# Patient Record
Sex: FEMALE | Race: BLACK OR AFRICAN AMERICAN | Employment: FULL TIME | URBAN - METROPOLITAN AREA
[De-identification: names, ages, dates, MRNs, and addresses within clinical notes are randomized per-mention and may not be internally consistent; named-entity substitution may affect disease eponyms.]

---

## 2017-08-28 ENCOUNTER — APPOINTMENT (OUTPATIENT)
Dept: GENERAL RADIOLOGY | Age: 52
End: 2017-08-28
Attending: EMERGENCY MEDICINE
Payer: MEDICARE

## 2017-08-28 ENCOUNTER — HOSPITAL ENCOUNTER (EMERGENCY)
Age: 52
Discharge: HOME | End: 2017-08-28
Attending: EMERGENCY MEDICINE
Payer: MEDICARE

## 2017-08-28 VITALS
TEMPERATURE: 98 F | HEIGHT: 59 IN | SYSTOLIC BLOOD PRESSURE: 119 MMHG | OXYGEN SATURATION: 100 % | RESPIRATION RATE: 16 BRPM | WEIGHT: 182 LBS | DIASTOLIC BLOOD PRESSURE: 83 MMHG | BODY MASS INDEX: 36.69 KG/M2 | HEART RATE: 75 BPM

## 2017-08-28 DIAGNOSIS — S93.601A FOOT SPRAIN, RIGHT, INITIAL ENCOUNTER: Primary | ICD-10-CM

## 2017-08-28 PROCEDURE — 73610 X-RAY EXAM OF ANKLE: CPT

## 2017-08-28 PROCEDURE — 73630 X-RAY EXAM OF FOOT: CPT

## 2017-08-28 PROCEDURE — 99283 EMERGENCY DEPT VISIT LOW MDM: CPT | Performed by: PHYSICIAN ASSISTANT

## 2017-08-28 NOTE — ED TRIAGE NOTES
Patient advises that she stepped into a hole in yard 3 days ago and complaining of right foot and ankle pain. Patient advises her family doctor wanted her to come here for Xray. Patient denies any further complaint.

## 2017-08-28 NOTE — ED PROVIDER NOTES
Patient is a 46 y.o. female presenting with foot injury. The history is provided by the patient. Foot Injury    This is a new problem. Episode onset: 3 days ago  The problem occurs constantly. The problem has not changed since onset. The pain is present in the right foot. The quality of the pain is described as aching. The pain is at a severity of 8/10. The pain is mild. Associated symptoms include stiffness. Pertinent negatives include no tingling. The symptoms are aggravated by activity and palpation. She has tried cold and rest for the symptoms. The treatment provided mild relief. There has been a history of trauma. Past Medical History:   Diagnosis Date    Anemia     Arthritis     Heart murmur     MVP (mitral valve prolapse)     Psychiatric disorder     depression       Past Surgical History:   Procedure Laterality Date    HX HYSTERECTOMY      HX OVARIAN CYST REMOVAL  1980's    HX TUBAL LIGATION  12/1984         Family History:   Problem Relation Age of Onset    Heart Disease Mother     Stroke Mother     Cancer Mother     No Known Problems Father        Social History     Social History    Marital status:      Spouse name: N/A    Number of children: N/A    Years of education: N/A     Occupational History    Not on file. Social History Main Topics    Smoking status: Former Smoker     Packs/day: 1.00     Years: 30.00     Quit date: 7/17/2008    Smokeless tobacco: Never Used    Alcohol use No    Drug use: No    Sexual activity: Not on file     Other Topics Concern    Not on file     Social History Narrative         ALLERGIES: Review of patient's allergies indicates no known allergies. Review of Systems   Musculoskeletal: Positive for stiffness. Neurological: Negative for tingling. All other systems reviewed and are negative.       Vitals:    08/28/17 1701   BP: 119/83   Pulse: 75   Resp: 16   Temp: 98 °F (36.7 °C)   SpO2: 100%   Weight: 82.6 kg (182 lb)   Height: 4' 11\" (1.499 m)            Physical Exam   Constitutional: She is oriented to person, place, and time. She appears well-developed and well-nourished. No distress. HENT:   Head: Normocephalic and atraumatic. Eyes: Conjunctivae and EOM are normal. Pupils are equal, round, and reactive to light. Neck: Normal range of motion. Neck supple. Cardiovascular: Normal rate and regular rhythm. Pulmonary/Chest: Effort normal and breath sounds normal. No respiratory distress. She has no wheezes. Abdominal: Soft. Bowel sounds are normal.   Musculoskeletal: She exhibits tenderness. She exhibits no edema or deformity. Left foot w/o deformity, dorsal pain to palpation, no ankle or knee pain    Neurological: She is alert and oriented to person, place, and time. Skin: Skin is warm and dry. Psychiatric: She has a normal mood and affect. Nursing note and vitals reviewed.        MDM  Number of Diagnoses or Management Options  Diagnosis management comments: Rt foot and ankle x rays negative wrap placed for foot sprain  Tylenol or motrin for pain        Amount and/or Complexity of Data Reviewed  Tests in the radiology section of CPT®: ordered and reviewed  Review and summarize past medical records: yes    Risk of Complications, Morbidity, and/or Mortality  Presenting problems: low  Diagnostic procedures: low  Management options: low    Patient Progress  Patient progress: improved    ED Course       Procedures

## 2017-08-28 NOTE — DISCHARGE INSTRUCTIONS
Foot Sprain: Care Instructions  Your Care Instructions    A foot sprain occurs when you stretch or tear the ligaments around your foot. Ligaments are the tough tissues that connect one bone to another. A sprain can happen when you run, fall, or hit your toe against something. Sprains often happen when you jump or change direction quickly. This may occur when you play basketball, soccer, or other sports. Most foot sprains will get better with treatment at home. Follow-up care is a key part of your treatment and safety. Be sure to make and go to all appointments, and call your doctor if you are having problems. It's also a good idea to know your test results and keep a list of the medicines you take. How can you care for yourself at home? · Walk or put weight on your sprained foot as long as it does not hurt. · If your doctor gave you a splint or immobilizer, wear it as directed. If you were given crutches, use them as directed. · For the first 2 days after your injury, avoid hot showers, hot tubs, or hot packs. They may increase swelling. · Put ice or a cold pack on your foot for 10 to 20 minutes at a time to stop swelling. Try this every 1 to 2 hours for 3 days (when you are awake) or until the swelling goes down. Put a thin cloth between the ice pack and your skin. Keep your splint dry. · After 2 or 3 days, if your swelling is gone, put a heating pad (set on low) or a warm cloth on your foot. Some doctors suggest that you go back and forth between hot and cold treatments. · Prop up your foot on a pillow when you ice it or anytime you sit or lie down. Try to keep it above the level of your heart. This will help reduce swelling. · Take pain medicines exactly as directed. ¨ If the doctor gave you a prescription medicine for pain, take it as prescribed. ¨ If you are not taking a prescription pain medicine, ask your doctor if you can take an over-the-counter medicine.   · Do any exercises that your doctor or physical therapist suggests. · Return to your usual exercise gradually as you feel better. When should you call for help? Call your doctor now or seek immediate medical care if:  · You have increased or severe pain. · Your toes are cool or pale or change color. · Your wrap or splint feels too tight. · You have signs of a blood clot, such as:  ¨ Pain in your calf, back of the knee, thigh, or groin. ¨ Redness and swelling in your leg or groin. · You have tingling, weakness, or numbness in your leg or foot. Watch closely for changes in your health, and be sure to contact your doctor if:  · You cannot put any weight on your foot. · You get a fever. · You do not get better as expected. Where can you learn more? Go to http://farshad-dayana.info/. Enter K719 in the search box to learn more about \"Foot Sprain: Care Instructions. \"  Current as of: March 21, 2017  Content Version: 11.3  © 9422-2492 [employer]. Care instructions adapted under license by [employer] (which disclaims liability or warranty for this information). If you have questions about a medical condition or this instruction, always ask your healthcare professional. [address] any warranty or liability for your use of this information.

## 2018-01-29 ENCOUNTER — HOSPITAL ENCOUNTER (EMERGENCY)
Age: 53
Discharge: HOME | End: 2018-01-29
Attending: EMERGENCY MEDICINE
Payer: MEDICARE

## 2018-01-29 VITALS
OXYGEN SATURATION: 98 % | WEIGHT: 180 LBS | HEART RATE: 71 BPM | HEIGHT: 59 IN | DIASTOLIC BLOOD PRESSURE: 84 MMHG | BODY MASS INDEX: 36.29 KG/M2 | TEMPERATURE: 97.8 F | SYSTOLIC BLOOD PRESSURE: 122 MMHG | RESPIRATION RATE: 16 BRPM

## 2018-01-29 DIAGNOSIS — T78.40XA ALLERGIC REACTION, INITIAL ENCOUNTER: Primary | ICD-10-CM

## 2018-01-29 LAB
ALBUMIN SERPL-MCNC: 3.4 G/DL (ref 3.5–5)
ALBUMIN/GLOB SERPL: 1.2 {RATIO} (ref 1.2–3.5)
ALP SERPL-CCNC: 115 U/L (ref 50–136)
ALT SERPL-CCNC: 25 U/L (ref 12–65)
ANION GAP SERPL CALC-SCNC: 12 MMOL/L (ref 7–16)
AST SERPL-CCNC: 21 U/L (ref 15–37)
BASOPHILS # BLD: 0 K/UL (ref 0–0.2)
BASOPHILS NFR BLD: 0 % (ref 0–2)
BILIRUB SERPL-MCNC: 0.2 MG/DL (ref 0.2–1.1)
BUN SERPL-MCNC: 7 MG/DL (ref 6–23)
CALCIUM SERPL-MCNC: 8.9 MG/DL (ref 8.3–10.4)
CHLORIDE SERPL-SCNC: 112 MMOL/L (ref 98–107)
CO2 SERPL-SCNC: 23 MMOL/L (ref 21–32)
CREAT SERPL-MCNC: 0.74 MG/DL (ref 0.6–1)
DIFFERENTIAL METHOD BLD: ABNORMAL
EOSINOPHIL # BLD: 0.2 K/UL (ref 0–0.8)
EOSINOPHIL NFR BLD: 4 % (ref 0.5–7.8)
ERYTHROCYTE [DISTWIDTH] IN BLOOD BY AUTOMATED COUNT: 15 % (ref 11.9–14.6)
GLOBULIN SER CALC-MCNC: 2.9 G/DL (ref 2.3–3.5)
GLUCOSE SERPL-MCNC: 104 MG/DL (ref 65–100)
HCT VFR BLD AUTO: 35.6 % (ref 35.8–46.3)
HGB BLD-MCNC: 11.5 G/DL (ref 11.7–15.4)
IMM GRANULOCYTES # BLD: 0 K/UL (ref 0–0.5)
IMM GRANULOCYTES NFR BLD AUTO: 0 % (ref 0–5)
LYMPHOCYTES # BLD: 2.7 K/UL (ref 0.5–4.6)
LYMPHOCYTES NFR BLD: 52 % (ref 13–44)
MCH RBC QN AUTO: 26.9 PG (ref 26.1–32.9)
MCHC RBC AUTO-ENTMCNC: 32.3 G/DL (ref 31.4–35)
MCV RBC AUTO: 83.2 FL (ref 79.6–97.8)
MONOCYTES # BLD: 0.5 K/UL (ref 0.1–1.3)
MONOCYTES NFR BLD: 9 % (ref 4–12)
NEUTS SEG # BLD: 1.8 K/UL (ref 1.7–8.2)
NEUTS SEG NFR BLD: 35 % (ref 43–78)
PLATELET # BLD AUTO: 279 K/UL (ref 150–450)
PMV BLD AUTO: 10.5 FL (ref 10.8–14.1)
POTASSIUM SERPL-SCNC: 3.6 MMOL/L (ref 3.5–5.1)
PROT SERPL-MCNC: 6.3 G/DL (ref 6.3–8.2)
RBC # BLD AUTO: 4.28 M/UL (ref 4.05–5.25)
SODIUM SERPL-SCNC: 147 MMOL/L (ref 136–145)
WBC # BLD AUTO: 5.2 K/UL (ref 4.3–11.1)

## 2018-01-29 PROCEDURE — 81003 URINALYSIS AUTO W/O SCOPE: CPT | Performed by: EMERGENCY MEDICINE

## 2018-01-29 PROCEDURE — 80053 COMPREHEN METABOLIC PANEL: CPT | Performed by: EMERGENCY MEDICINE

## 2018-01-29 PROCEDURE — 99284 EMERGENCY DEPT VISIT MOD MDM: CPT | Performed by: EMERGENCY MEDICINE

## 2018-01-29 PROCEDURE — 74011250637 HC RX REV CODE- 250/637: Performed by: EMERGENCY MEDICINE

## 2018-01-29 PROCEDURE — 74011636637 HC RX REV CODE- 636/637: Performed by: EMERGENCY MEDICINE

## 2018-01-29 PROCEDURE — 85025 COMPLETE CBC W/AUTO DIFF WBC: CPT | Performed by: EMERGENCY MEDICINE

## 2018-01-29 RX ORDER — PREDNISONE 20 MG/1
60 TABLET ORAL
Status: COMPLETED | OUTPATIENT
Start: 2018-01-29 | End: 2018-01-29

## 2018-01-29 RX ORDER — DIPHENHYDRAMINE HCL 25 MG
25 CAPSULE ORAL
Status: COMPLETED | OUTPATIENT
Start: 2018-01-29 | End: 2018-01-29

## 2018-01-29 RX ORDER — PREDNISONE 20 MG/1
40 TABLET ORAL DAILY
Qty: 6 TAB | Refills: 0 | Status: SHIPPED | OUTPATIENT
Start: 2018-01-29 | End: 2018-02-01

## 2018-01-29 RX ORDER — SODIUM CHLORIDE 0.9 % (FLUSH) 0.9 %
5-10 SYRINGE (ML) INJECTION EVERY 8 HOURS
Status: DISCONTINUED | OUTPATIENT
Start: 2018-01-29 | End: 2018-01-29 | Stop reason: HOSPADM

## 2018-01-29 RX ORDER — SODIUM CHLORIDE 0.9 % (FLUSH) 0.9 %
5-10 SYRINGE (ML) INJECTION AS NEEDED
Status: DISCONTINUED | OUTPATIENT
Start: 2018-01-29 | End: 2018-01-29 | Stop reason: HOSPADM

## 2018-01-29 RX ADMIN — PREDNISONE 60 MG: 20 TABLET ORAL at 01:58

## 2018-01-29 RX ADMIN — DIPHENHYDRAMINE HYDROCHLORIDE 25 MG: 25 CAPSULE ORAL at 01:58

## 2018-01-29 NOTE — ED TRIAGE NOTES
Pt stated that reaction started after dying her hair \"two days ago\". Pt has took benadryl 30 minutes ago, but cannot recall amount. Denies trouble breathing swallowing, and seeing. Pt reports localized swelling around base of neck and sides of head.

## 2018-01-29 NOTE — ED NOTES
I have reviewed discharge instructions with the patient. The patient verbalized understanding. Patient left ED via Discharge Method: ambulatory to Home with ( family, self). Opportunity for questions and clarification provided. Patient given 1 scripts. To continue your aftercare when you leave the hospital, you may receive an automated call from our care team to check in on how you are doing. This is a free service and part of our promise to provide the best care and service to meet your aftercare needs.  If you have questions, or wish to unsubscribe from this service please call [phone]. Thank you for Choosing our [facility] Emergency Department.

## 2018-01-29 NOTE — LETTER
[facility] EMERGENCY DEPT 
[address] [place] [id number] 
[phone] Work/School Note Date: 1/29/2018 To Whom It May concern: 
 
Serge Schuler was seen and treated today in the emergency room by the following provider(s): 
Attending Provider: Aaron Porras MD.   
 
Serge Number {Return to school/sport/work:1/30/2018 Sincerely, Meron Chacon RN

## 2018-01-29 NOTE — DISCHARGE INSTRUCTIONS

## 2018-01-29 NOTE — ED NOTES
Pt presents to the ED for a possible allergic reaction after dyeing her hair yesterday.   C/o her scalp swelling

## 2018-01-30 NOTE — ED PROVIDER NOTES
Patient is a 46 y.o. female presenting with allergic reaction. The history is provided by the patient. Allergic Reaction    This is a new problem. The problem has not changed since onset. Ingested substance: hair dye, topcally. Ingestion amount is known. She has not vomited. There were no pill fragments found in her mouth. There were no witnesses present for the ingestion. Pertinent negatives include no unusual behavior, no nausea, no vomiting and no shortness of breath. Other available medicines included diphenhydramine. Past medical history comments: graying. Past Medical History:   Diagnosis Date    Anemia     Heart murmur     MVP (mitral valve prolapse)     Psychiatric disorder     depression       Past Surgical History:   Procedure Laterality Date    HX HYSTERECTOMY      HX OVARIAN CYST REMOVAL  1980's    HX TUBAL LIGATION  12/1984         Family History:   Problem Relation Age of Onset    Heart Disease Mother     Stroke Mother     Cancer Mother     No Known Problems Father        Social History     Social History    Marital status:      Spouse name: N/A    Number of children: N/A    Years of education: N/A     Occupational History    Not on file. Social History Main Topics    Smoking status: Former Smoker     Packs/day: 1.00     Years: 30.00     Quit date: 7/17/2008    Smokeless tobacco: Never Used    Alcohol use No    Drug use: No    Sexual activity: Not on file     Other Topics Concern    Not on file     Social History Narrative         ALLERGIES: Review of patient's allergies indicates no known allergies. Review of Systems   Constitutional: Negative for chills and fever. HENT: Negative for rhinorrhea, sore throat, trouble swallowing and voice change. Eyes: Negative for discharge and redness. Respiratory: Negative for cough, shortness of breath, wheezing and stridor. Cardiovascular: Negative for chest pain.    Gastrointestinal: Negative for abdominal pain, nausea and vomiting. Musculoskeletal: Negative for arthralgias and back pain. Skin: Positive for rash. Neurological: Negative for dizziness and headaches. All other systems reviewed and are negative. Vitals:    01/29/18 0036 01/29/18 0209   BP: 127/87 122/84   Pulse: 69 71   Resp:  16   Temp: 97.8 °F (36.6 °C)    SpO2: 98% 98%   Weight: 81.6 kg (180 lb)    Height: 4' 11\" (1.499 m)             Physical Exam   Constitutional: She is oriented to person, place, and time. She appears well-developed and well-nourished. No distress. HENT:   Head: Normocephalic and atraumatic. Eyes: Conjunctivae and EOM are normal. Right eye exhibits no discharge. Left eye exhibits no discharge. Neck: Normal range of motion. Neck supple. Pulmonary/Chest: Effort normal. No respiratory distress. Musculoskeletal: Normal range of motion. Neurological: She is alert and oriented to person, place, and time. She has normal strength. She exhibits normal muscle tone. cni 2-12 grossly  Nl gait,  Nl speech     Skin: Skin is warm and dry. Rash noted. She is not diaphoretic. Eczematous dry thickening about neck and scalp   Psychiatric: She has a normal mood and affect. Her behavior is normal.   Nursing note and vitals reviewed. MDM  Number of Diagnoses or Management Options  Allergic reaction, initial encounter:   Diagnosis management comments: Medical decision making note:  Minor allergic reaction,  eteroids  This concludes the \"medical decision making note\" part of this emergency department visit note.       ED Course       Procedures

## 2019-03-22 ENCOUNTER — HOSPITAL ENCOUNTER (OUTPATIENT)
Dept: GENERAL RADIOLOGY | Age: 54
Discharge: HOME | End: 2019-03-22
Payer: MEDICARE

## 2019-03-22 DIAGNOSIS — M25.561 ACUTE PAIN OF RIGHT KNEE: ICD-10-CM

## 2019-03-22 PROCEDURE — 73560 X-RAY EXAM OF KNEE 1 OR 2: CPT

## 2019-04-26 ENCOUNTER — HOSPITAL ENCOUNTER (OUTPATIENT)
Dept: LAB | Age: 54
Discharge: HOME | End: 2019-04-26

## 2019-04-26 PROCEDURE — 88305 TISSUE EXAM BY PATHOLOGIST: CPT

## 2019-04-26 PROCEDURE — 88312 SPECIAL STAINS GROUP 1: CPT

## 2019-05-15 ENCOUNTER — HOSPITAL ENCOUNTER (OUTPATIENT)
Dept: MRI IMAGING | Age: 54
Discharge: HOME | End: 2019-05-15
Attending: NURSE PRACTITIONER
Payer: MEDICARE

## 2019-05-15 DIAGNOSIS — M25.561 ACUTE PAIN OF RIGHT KNEE: ICD-10-CM

## 2019-05-15 PROCEDURE — 73721 MRI JNT OF LWR EXTRE W/O DYE: CPT

## 2019-07-19 ENCOUNTER — HOSPITAL ENCOUNTER (OUTPATIENT)
Dept: ULTRASOUND IMAGING | Age: 54
Discharge: HOME | End: 2019-07-19
Attending: NURSE PRACTITIONER
Payer: MEDICARE

## 2019-07-19 DIAGNOSIS — J39.2 THROAT MASS: ICD-10-CM

## 2019-07-19 PROCEDURE — 76536 US EXAM OF HEAD AND NECK: CPT

## 2019-08-01 ENCOUNTER — HOSPITAL ENCOUNTER (OUTPATIENT)
Dept: ULTRASOUND IMAGING | Age: 54
Discharge: HOME | End: 2019-08-01
Attending: NURSE PRACTITIONER

## 2020-03-01 ENCOUNTER — HOSPITAL ENCOUNTER (EMERGENCY)
Age: 55
Discharge: HOME | End: 2020-03-01
Attending: EMERGENCY MEDICINE
Payer: MEDICARE

## 2020-03-01 ENCOUNTER — APPOINTMENT (OUTPATIENT)
Dept: CT IMAGING | Age: 55
End: 2020-03-01
Attending: EMERGENCY MEDICINE
Payer: MEDICARE

## 2020-03-01 VITALS
HEIGHT: 59 IN | WEIGHT: 160 LBS | SYSTOLIC BLOOD PRESSURE: 143 MMHG | BODY MASS INDEX: 32.25 KG/M2 | RESPIRATION RATE: 16 BRPM | TEMPERATURE: 98 F | OXYGEN SATURATION: 96 % | DIASTOLIC BLOOD PRESSURE: 88 MMHG | HEART RATE: 74 BPM

## 2020-03-01 DIAGNOSIS — G44.229 CHRONIC TENSION-TYPE HEADACHE, NOT INTRACTABLE: Primary | ICD-10-CM

## 2020-03-01 PROCEDURE — 99283 EMERGENCY DEPT VISIT LOW MDM: CPT

## 2020-03-01 PROCEDURE — 70450 CT HEAD/BRAIN W/O DYE: CPT

## 2020-03-01 NOTE — DISCHARGE INSTRUCTIONS
Use Tylenol or ibuprofen as needed for pain. Follow-up with a primary care physician or return for any other acute concerns.
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
## 2020-03-01 NOTE — ED NOTES
I have reviewed discharge instructions with the patient. The patient verbalized understanding. Patient left ED via Discharge Method: ambulatory to Home with (self). Opportunity for questions and clarification provided. Patient given 0 scripts. To continue your aftercare when you leave the hospital, you may receive an automated call from our care team to check in on how you are doing. This is a free service and part of our promise to provide the best care and service to meet your aftercare needs.  If you have questions, or wish to unsubscribe from this service please call [phone]. Thank you for Choosing our [facility] Emergency Department.

## 2020-10-01 PROBLEM — R59.0 CERVICAL LYMPHADENOPATHY: Status: ACTIVE | Noted: 2020-10-01

## 2022-03-18 PROBLEM — R59.0 CERVICAL LYMPHADENOPATHY: Status: ACTIVE | Noted: 2020-10-01

## 2023-02-24 ENCOUNTER — HOSPITAL ENCOUNTER (EMERGENCY)
Age: 58
Discharge: HOME | End: 2023-02-24
Attending: EMERGENCY MEDICINE
Payer: MEDICARE

## 2023-02-24 VITALS
BODY MASS INDEX: 28.22 KG/M2 | HEART RATE: 78 BPM | TEMPERATURE: 97.8 F | RESPIRATION RATE: 16 BRPM | DIASTOLIC BLOOD PRESSURE: 82 MMHG | SYSTOLIC BLOOD PRESSURE: 146 MMHG | OXYGEN SATURATION: 100 % | HEIGHT: 59 IN | WEIGHT: 140 LBS

## 2023-02-24 DIAGNOSIS — M54.2 NECK PAIN: Primary | ICD-10-CM

## 2023-02-24 PROCEDURE — 96372 THER/PROPH/DIAG INJ SC/IM: CPT

## 2023-02-24 PROCEDURE — 99284 EMERGENCY DEPT VISIT MOD MDM: CPT

## 2023-02-24 PROCEDURE — 6360000002 HC RX W HCPCS: Performed by: STUDENT IN AN ORGANIZED HEALTH CARE EDUCATION/TRAINING PROGRAM

## 2023-02-24 RX ORDER — DEXAMETHASONE SODIUM PHOSPHATE 10 MG/ML
10 INJECTION, SOLUTION INTRAMUSCULAR; INTRAVENOUS ONCE
Status: COMPLETED | OUTPATIENT
Start: 2023-02-24 | End: 2023-02-24

## 2023-02-24 RX ORDER — MELOXICAM 7.5 MG/1
7.5 TABLET ORAL DAILY
Qty: 30 TABLET | Refills: 3 | Status: SHIPPED | OUTPATIENT
Start: 2023-02-24

## 2023-02-24 RX ORDER — KETOROLAC TROMETHAMINE 15 MG/ML
15 INJECTION, SOLUTION INTRAMUSCULAR; INTRAVENOUS ONCE
Status: COMPLETED | OUTPATIENT
Start: 2023-02-24 | End: 2023-02-24

## 2023-02-24 RX ORDER — TRAMADOL HYDROCHLORIDE 50 MG/1
50 TABLET ORAL EVERY 6 HOURS PRN
Qty: 12 TABLET | Refills: 0 | Status: SHIPPED | OUTPATIENT
Start: 2023-02-24 | End: 2023-02-27

## 2023-02-24 RX ADMIN — DEXAMETHASONE SODIUM PHOSPHATE 10 MG: 10 INJECTION, SOLUTION INTRAMUSCULAR; INTRAVENOUS at 13:47

## 2023-02-24 RX ADMIN — KETOROLAC TROMETHAMINE 15 MG: 15 INJECTION, SOLUTION INTRAMUSCULAR; INTRAVENOUS at 13:47

## 2023-02-24 ASSESSMENT — ENCOUNTER SYMPTOMS
COUGH: 0
TROUBLE SWALLOWING: 0
FACIAL SWELLING: 0
BACK PAIN: 1
ABDOMINAL PAIN: 0
EYE DISCHARGE: 0
DIARRHEA: 0
EYE PAIN: 0
WHEEZING: 0
VOMITING: 0
EYE REDNESS: 0
PHOTOPHOBIA: 0
SORE THROAT: 0
VOICE CHANGE: 0
APNEA: 0
RHINORRHEA: 0
CHEST TIGHTNESS: 0
SHORTNESS OF BREATH: 0

## 2023-02-24 ASSESSMENT — PAIN DESCRIPTION - LOCATION: LOCATION: HEAD;NECK

## 2023-02-24 ASSESSMENT — PAIN SCALES - GENERAL
PAINLEVEL_OUTOF10: 7
PAINLEVEL_OUTOF10: 7

## 2023-02-24 ASSESSMENT — PAIN - FUNCTIONAL ASSESSMENT: PAIN_FUNCTIONAL_ASSESSMENT: 0-10

## 2023-02-24 NOTE — ED TRIAGE NOTES
Patient advises that she has been having chronic neck and lower back pain for many years and use to get injections however has not done that in a while. Patient also complaining of headache and right sided ear stopped up. Patient denies any trauma.

## 2023-02-24 NOTE — ED NOTES
I have reviewed discharge instructions with the patient. The patient verbalized understanding. Patient left ED via Discharge Method: ambulatory to Home with self. Opportunity for questions and clarification provided. Patient given 2 scripts. To continue your aftercare when you leave the hospital, you may receive an automated call from our care team to check in on how you are doing. This is a free service and part of our promise to provide the best care and service to meet your aftercare needs.  If you have questions, or wish to unsubscribe from this service please call [phone]. Thank you for Choosing our [facility] Emergency Department.         Jaz Campoverde RN  02/24/23 1783

## 2023-02-24 NOTE — ED PROVIDER NOTES
Emergency Department Provider Note                   PCP:                RANJIT Black NP               Age: 62 y.o. Sex: female       ICD-10-CM    1. Neck pain  M54.2 [address] [place] [address]      traMAlly Wright) 50 MG tablet          DISPOSITION Discharge - Pending Orders Complete 02/24/2023 01:29:55 PM        Medical Decision Making  In summary this is a 54-year-old female patient who presented today for evaluation of chronic back and neck pain. No new injury to suggest acute fracture. Did not feel that repeat imaging was warranted. No focal deficits on exam.  No sensory deficits or weakness. Range of motion is full. There is some mild paracervical and paralumbar muscular spinal tenderness. There is no midline tenderness. There is no concerning signs for cauda equina or AAA. No red flags for cancer at this time. No urinary complaints or fevers or vomiting to suggest urinary tract infection. No dizziness or diplopia or signs of arterial dissection. Patient was complaining of some pain in the ear however the ear exam is normal.  No signs of mastoiditis or infectious pathology. Plan for this patient will be pain control and referral to orthopedics. Counseled on warning signs to return for including but elevated to severe pain, urinary complaints, numbness, focal deficits. Patient has verbalized understanding and agreed with the plan. Discharged in stable condition. Risk  Prescription drug management. Complexity of Problem: 1 or more chronic illness with exacerbation. (4)      I have reviewed records from an external source: ED records from outside this hospital.  I have reviewed records from an external source: provider visit notes from PCP.   I have reviewed records from an external source: provider visit notes from outside specialist.  I have reviewed records from an external source: previous imaging studies from outside [address] [facility] mri reviewed  Considerations: The following labs and/or imaging studies were considered but not ordered: xray neck, xray lumbar spine        Patient was discharged risks and benefits of hospitalization were discussed. Orders Placed This Encounter   Procedures    [facility] [employer]         Medications   ketorolac (TORADOL) injection 15 mg (15 mg IntraMUSCular Given 2/24/23 1347)   dexamethasone (PF) (DECADRON) injection 10 mg (10 mg IntraMUSCular Given 2/24/23 1347)       New Prescriptions    MELOXICAM (MOBIC) 7.5 MG TABLET    Take 1 tablet by mouth daily    TRAMADOL (ULTRAM) 50 MG TABLET    Take 1 tablet by mouth every 6 hours as needed for Pain for up to 3 days. Intended supply: 3 days. Take lowest dose possible to manage pain Max Daily Amount: 200 mg        Keegan العراقي is a 62 y.o. female who presents to the Emergency Department with chief complaint of    Chief Complaint   Patient presents with    Back Pain    Neck Pain      80-year-old female patient presents today complaining of chronic neck and chronic low back pain that is been going on for many years. She states she takes duloxetine and naproxen intermittently. She reports she used to get injections but has not had that done in a while. She states she feels like her neck pain \"is pulling on my ear. \"  She states that has been going on for several months. She denies pain radiating down her arms, numbness. Denies weakness or focal deficits. Denies any new injury or trauma. Denies saddle anesthesia, urinary retention, urinary incontinence, fecal incontinence, leg weakness. Denies abdominal pain or pulsing masses. Denies night sweats, unintentional weight loss, fevers. Patient is primary historian and quality is reliable. The history is provided by the patient. No  was used. Review of Systems   Constitutional:  Negative for fatigue and fever.    HENT:  Positive for ear pain. Negative for congestion, facial swelling, hearing loss, rhinorrhea, sore throat, trouble swallowing and voice change. Eyes:  Negative for photophobia, pain, discharge, redness and visual disturbance. Respiratory:  Negative for apnea, cough, chest tightness, shortness of breath and wheezing. Cardiovascular:  Negative for chest pain and palpitations. Gastrointestinal:  Negative for abdominal pain, diarrhea and vomiting. Endocrine: Negative for polydipsia, polyphagia and polyuria. Genitourinary:  Negative for decreased urine volume, dysuria, flank pain, frequency and hematuria. Musculoskeletal:  Positive for back pain and neck pain. Negative for arthralgias, joint swelling, myalgias and neck stiffness. Skin:  Negative for rash and wound. Neurological:  Negative for dizziness, syncope, speech difficulty, weakness, light-headedness and headaches. Hematological:  Does not bruise/bleed easily. Psychiatric/Behavioral:  Negative for self-injury and suicidal ideas. All other systems reviewed and are negative.     Past Medical History:   Diagnosis Date    Anemia     Fatty liver     Major depression     Mitral valve prolapse     Nodule of left lobe of thyroid gland         Past Surgical History:   Procedure Laterality Date    HYSTERECTOMY (CERVIX STATUS UNKNOWN)      patient does not know if ovaries were removed    OVARIAN CYST REMOVAL      TUBAL LIGATION  1984        Family History   Problem Relation Age of Onset    Thyroid Disease Neg Hx     Thyroid Cancer Neg Hx     No Known Problems Father     Stroke Mother     Heart Disease Mother     Cancer Mother         Social History     Socioeconomic History    Marital status: Single     Spouse name: None    Number of children: None    Years of education: None    Highest education level: None   Tobacco Use    Smoking status: Former     Packs/day: 1.00     Types: Cigarettes     Quit date: 2008     Years since quittin.6    Smokeless tobacco: Never   Substance and Sexual Activity    Alcohol use: No    Drug use: No         Patient has no known allergies. Previous Medications    HYDROCHLOROTHIAZIDE (HYDRODIURIL) 12.5 MG TABLET    Take 12.5 mg by mouth daily        Vitals signs and nursing note reviewed. Patient Vitals for the past 4 hrs:   Temp Pulse Resp BP SpO2   02/24/23 1146 98.3 °F (36.8 °C) 80 16 (!) 149/88 100 %          Physical Exam  Vitals and nursing note reviewed. Constitutional:       General: She is not in acute distress. Appearance: Normal appearance. She is not ill-appearing, toxic-appearing or diaphoretic. HENT:      Head: Normocephalic and atraumatic. Right Ear: Tympanic membrane, ear canal and external ear normal.      Left Ear: Tympanic membrane, ear canal and external ear normal.      Ears:      Comments: Bilateral ear exam is within normal limits. No signs of otitis externa or otitis media. TMs pearly gray     Nose: Nose normal.      Mouth/Throat:      Mouth: Mucous membranes are moist.      Pharynx: Oropharynx is clear. Eyes:      Extraocular Movements: Extraocular movements intact. Pupils: Pupils are equal, round, and reactive to light. Neck:      Comments: Mild paracervical spinal muscle tenderness. Pain worsened with right lateral rotation. Negative Spurling maneuver bilaterally. No midline tenderness or step-offs or deformities. No meningeal signs  Cardiovascular:      Rate and Rhythm: Normal rate and regular rhythm. Pulses: Normal pulses. Heart sounds: Normal heart sounds. No murmur heard. Comments: No pulse deficits. Pulmonary:      Effort: Pulmonary effort is normal. No respiratory distress. Breath sounds: Normal breath sounds. No wheezing, rhonchi or rales. Abdominal:      General: Abdomen is flat. Bowel sounds are normal. There is no distension. Palpations: Abdomen is soft. There is no mass. Tenderness: There is no abdominal tenderness.  There is no right CVA tenderness, left CVA tenderness, guarding or rebound. Hernia: No hernia is present. Comments: No pulsating masses. Genitourinary:     Comments: Declined by patient  Musculoskeletal:      Cervical back: Normal range of motion and neck supple. Tenderness present. No rigidity or bony tenderness. Thoracic back: Normal. No tenderness. Lumbar back: Spasms and tenderness present. No edema, deformity, lacerations or bony tenderness. Normal range of motion. Negative right straight leg raise test and negative left straight leg raise test.        Back:       Right lower leg: No edema. Left lower leg: No edema. Comments: No midline tenderness. No stepoffs or deformities. Paraspinal muscle tenderness in area outlined above. Lymphadenopathy:      Cervical: No cervical adenopathy. Skin:     General: Skin is warm and dry. Capillary Refill: Capillary refill takes less than 2 seconds. Findings: No rash. Neurological:      General: No focal deficit present. Mental Status: She is alert and oriented to person, place, and time. Mental status is at baseline. Cranial Nerves: No cranial nerve deficit. Sensory: No sensory deficit. Motor: No weakness. Coordination: Coordination normal.      Gait: Gait normal.      Deep Tendon Reflexes: Reflexes normal.      Comments: Normal neuro exam. No saddle anesthesia. No leg weakness. Good reflexes. Psychiatric:         Mood and Affect: Mood normal.         Behavior: Behavior normal.         Thought Content: Thought content normal.         Judgment: Judgment normal.        Procedures    No results found for any visits on 02/24/23. No orders to display                       Voice dictation software was used during the making of this note. This software is not perfect and grammatical and other typographical errors may be present. This note has not been completely proofread for errors.      Jean Paul Awad, PA  02/24/23 1342

## 2023-03-10 ENCOUNTER — OFFICE VISIT (OUTPATIENT)
Dept: ORTHOPEDIC SURGERY | Age: 58
End: 2023-03-10

## 2023-03-10 DIAGNOSIS — M54.12 CERVICAL RADICULOPATHY: ICD-10-CM

## 2023-03-10 DIAGNOSIS — M47.812 CERVICAL FACET JOINT SYNDROME: ICD-10-CM

## 2023-03-10 DIAGNOSIS — M50.30 DDD (DEGENERATIVE DISC DISEASE), CERVICAL: ICD-10-CM

## 2023-03-10 DIAGNOSIS — M54.2 NECK PAIN: Primary | ICD-10-CM

## 2023-03-10 RX ORDER — FERROUS SULFATE 325(65) MG
TABLET ORAL
COMMUNITY
Start: 2015-10-08

## 2023-03-10 RX ORDER — METHYLPREDNISOLONE 4 MG/1
TABLET ORAL
Qty: 1 KIT | Refills: 0 | Status: SHIPPED | OUTPATIENT
Start: 2023-03-10

## 2023-03-10 RX ORDER — DULOXETIN HYDROCHLORIDE 30 MG/1
CAPSULE, DELAYED RELEASE ORAL
COMMUNITY
Start: 2022-12-29

## 2023-03-10 RX ORDER — BUPROPION HYDROCHLORIDE 150 MG/1
TABLET ORAL
COMMUNITY
Start: 2023-03-07

## 2023-03-10 RX ORDER — DULOXETIN HYDROCHLORIDE 60 MG/1
CAPSULE, DELAYED RELEASE ORAL
COMMUNITY
Start: 2023-03-07

## 2023-03-10 NOTE — LETTER
[facility]                                                     ARIANNA GUZMAN  1965  MRN [id number]                                              ROOM NUMBER______      Radiographic Studies:    Cervical MRI      Thoracic MRI         Lumbar MRI          Pelvis MRI        CONTRAST    CT Myelogram: _______________   NCS/EMG ________________ ( UE  /  LE )     MRI of ___________________          Other: ____________________      Injections:    KNEE    HIP  Depomedrol _____ mg Euflexxa _____    _______________ TFESI/SNRB  _______________ SI Joint  _______________ ADY    _______________ Facet  _______________Piriformis/ Sciatica      Medications:    Oral Steroids _______________  NSAIDS _______________    Muscle Relaxers _______________  Neurontin/Lyrica _______________    Pain Medicine _______________  Other _______________                       Physical Therapy:    Lumbar     Thoracic      Cervical     Hip       Knee       Shoulder               Traction          Ultrasound          Dry Needling      Referral:    Pain referral:  [facility]   [facility]   Other: ______________________________    Follow-up/ Refer__________________________________________________    Authorization to hold blood thinners:___________________________________

## 2023-03-10 NOTE — PROGRESS NOTES
Name: Sana Little  YOB: 1965  Gender: female  MRN: [id number]    CC: Neck Pain (Neck pain)       History of present illness: This is a very pleasant 62 y.o. old female who  has a past medical history of Anemia, Fatty liver, Major depression, Mitral valve prolapse, and Nodule of left lobe of thyroid gland. .  Presents with 3-month history of pain in her neck mostly on the right of the neck. It began in December actually felt like she had pain radiating towards her ear and her scalp and her ear stopped up that has resolved but she now has pain in the right shoulder right posterior arm under the arm there is some occasional tingling in the arm. She feels like there is a pulling she certainly noticed decreased range of motion of the neck. She has not had difficulty with fine motor activity. She is a  she has difficulty with turning especially after driving long distances. She has been on tramadol, duloxetine and had massage therapy. There have not been changes in fine motor skills such as handwriting and buttoning buttons. There have not been changes in gait since the onset of the symptoms. The patient has not had cervical surgery in the past.       AMB PAIN ASSESSMENT 3/10/2023   Location of Pain Neck   Severity of Pain 5   Quality of Pain Aching; Other (Comment)   Duration of Pain A few hours   Frequency of Pain Intermittent   Date Pain First Started 6/20/2008   Aggravating Factors Other (Comment)   Limiting Behavior Some   Relieving Factors Other (Comment)   Result of Injury No   Work-Related Injury No   Are there other pain locations you wish to document? No          ROS/Meds/PSH/PMH/FH/SH: I personally reviewed the patient's collected intake data.   Below are the pertinents:    No Known Allergies      Current Outpatient Medications:     buPROPion (WELLBUTRIN XL) 150 MG extended release tablet, , Disp: , Rfl:     DULoxetine (CYMBALTA) 60 MG extended release capsule, , Disp: , Rfl:     DULoxetine (CYMBALTA) 30 MG extended release capsule, TAKE ONE CAPSULE BY MOUTH ONE TIME DAILY FOR 7 DAYS, THEN TAKE TWO CAPSULES BY MOUTH ONE TIME DAILY FOR 30 DAYS, Disp: , Rfl:     ferrous sulfate (IRON 325) 325 (65 Fe) MG tablet, , Disp: , Rfl:     methylPREDNISolone (MEDROL DOSEPACK) 4 MG tablet, Take by mouth as directed., Disp: 1 kit, Rfl: 0    meloxicam (MOBIC) 7.5 MG tablet, Take 1 tablet by mouth daily, Disp: 30 tablet, Rfl: 3    hydroCHLOROthiazide (HYDRODIURIL) 12.5 MG tablet, Take 12.5 mg by mouth daily, Disp: , Rfl:   Past Surgical History:   Procedure Laterality Date    HYSTERECTOMY (CERVIX STATUS UNKNOWN)      patient does not know if ovaries were removed    OVARIAN CYST REMOVAL  1980's    TUBAL LIGATION  12/1984     Patient Active Problem List   Diagnosis    Major depression    Cervical lymphadenopathy    Fibroids, intramural    Nodule of left lobe of thyroid gland    Neck pain         Tobacco:  reports that she quit smoking about 14 years ago. She smoked an average of 1 pack per day. She has never used smokeless tobacco.  Alcohol:   Social History     Substance and Sexual Activity   Alcohol Use No        Physical Exam:   BMI: There is no height or weight on file to calculate BMI. GENERAL:  Adult in no acute distress, well developed, well nourished . Patient is appropriately conversant  CERVICAL SPINE:  Inspection of the neck reveals no evidence of rash or skin lesion. Examination of the cervical spine reveals no evidence of sagittal or coronal plane deformity, decreased ROM, and palpable tenderness  Spurling's sign is positive to the right side for reproduction of radicular symptoms. Patient ambulates with a normal gait. Patient is  is able to toe walk and heel walk.      Sensory testing reveals intact sensation to light touch in the distribution of the C5-T1 dermatomes bilaterally  Reflexes     Right Left   Biceps (C5) 1 1   Brachio radialis (C6) 1 1    Triceps (C7) 1 1 Pantoja's is negative     Inverted radial reflex is negative      Tinel's and Trevor testing over the cubital and carpal tunnels do not reproduce the symptoms. Shoulder examination is not consistent with adhesive capsulitis or acute rotator cuff tendinitis. Strength testing in the upper extremity reveals the following based on the 5 point grading scale:     Delt(C5) Bicep(C6) WE(C6) Tricep (C7) WF(C7) (C8) Int (T1)   Right 5 5 5 5 4 5 5   Left 5 5 5 5 5 5 5     Pulses are palpable over bilateral radial arteries. Radiographic Studies:     AP and Lateral views of the Cervical Spine: 3/10/23  AP of the cervical spine shows normal alignment there is facet arthropathy. Sagittal view of the cervical spine shows straightening of the normal lordotic curve. Multilevel degenerative changes. Degenerative disc at C6-7 with large anterior osteophyte. X-ray impression: Cervical degenerative change      Assessment/Plan:         Diagnosis Orders   1. Neck pain  XR CERVICAL SPINE (2-3 VIEWS)    Ambulatory referral to Physical Therapy      2. Cervical radiculopathy  Ambulatory referral to Physical Therapy      3. DDD (degenerative disc disease), cervical  Ambulatory referral to Physical Therapy      4. Cervical facet joint syndrome  Ambulatory referral to Physical Therapy          This patient's clinical history and physical exam is consistent with cervical radiculopathy involving primarily the right C7 nerve root(s). She may have also had a upper cervical radiculopathy but that seems to have resolved and now it seems to be more of a C7. I discussed the natural history of this condition with the patient in that often these patients will experience diminishing of their symptoms within several weeks of conservative care. We also discussed that the typical conservative treatments available include rest, NSAIDs, pain medication, and physical therapy as symptoms allow.   Oral and/or epidural steroids are other options to consider. I discussed potential surgical options including a discectomy and fusion if the symptoms fail to improve or there is a progressive neurologic deficit and conservative management has been exhausted. -PT:  A course of physical therapy was prescribed in an attempt to reduce pain and inflammation, improve postural awareness, and increase cervical range of motion. Modalities such as dry needling, ultrasound, moist heat, TENS, and ice may be helpful for pain control. Soft tissue mobilization is often helpful for spasm. Other modalities such as cervical traction may be helpful in reducing radicular symptoms. I have recommended 2-3 times per week for the next 4-6 weeks     -MRI if fails conservative treatment. If the patient fails to respond to these efforts, I would recommend MRI of the cervical spine for further evaluation.  -Steroids: A short oral steroid taper was prescribed to try to bring the more acute symptoms under control. The patient understands that there are risks associated with steroids including elevated blood sugar, hypertension, increased risk of infections, and thinning of the bones if taken repeatedly or for prolonged periods. The taper can be followed by NSAIDs once completed          Orders Placed This Encounter   Medications    methylPREDNISolone (MEDROL DOSEPACK) 4 MG tablet     Sig: Take by mouth as directed. Dispense:  1 kit     Refill:  0        Orders Placed This Encounter   Procedures    XR CERVICAL SPINE (2-3 VIEWS)    Ambulatory referral to Physical Therapy        4 This is a undiagnosed new problem with uncertain prognosis      Return in about 6 weeks (around 4/21/2023) for after PT with [facility]. Colonel Marcos PA-C  03/10/23      Elements of this note were created using speech recognition software. As such, errors of speech recognition may be present.

## 2023-03-17 ENCOUNTER — TELEPHONE (OUTPATIENT)
Dept: ORTHOPEDIC SURGERY | Age: 58
End: 2023-03-17

## 2023-03-17 NOTE — TELEPHONE ENCOUNTER
She is asking for a call from [facility]. She has a question about her RX and her medical records.

## 2023-03-20 ENCOUNTER — TELEPHONE (OUTPATIENT)
Dept: ORTHOPEDIC SURGERY | Age: 58
End: 2023-03-20

## 2023-03-20 NOTE — TELEPHONE ENCOUNTER
She called last week and did not hear from anyone. She would like to get her MRI results. Please give her a call.

## 2023-03-20 NOTE — TELEPHONE ENCOUNTER
Spoke with patient who requests results from her Cervical xrays. I informed her that I will send those results through [employer] [employer].

## 2023-04-03 ENCOUNTER — OFFICE VISIT (OUTPATIENT)
Age: 58
End: 2023-04-03
Payer: MEDICARE

## 2023-04-03 DIAGNOSIS — Z74.09 IMPAIRED MOBILITY AND ADLS: ICD-10-CM

## 2023-04-03 DIAGNOSIS — M53.82 IMPAIRED RANGE OF MOTION OF CERVICAL SPINE: ICD-10-CM

## 2023-04-03 DIAGNOSIS — M54.12 CERVICAL RADICULOPATHY: ICD-10-CM

## 2023-04-03 DIAGNOSIS — Z78.9 IMPAIRED MOBILITY AND ADLS: ICD-10-CM

## 2023-04-03 DIAGNOSIS — Z78.9 IMPAIRED MOTOR CONTROL: ICD-10-CM

## 2023-04-03 DIAGNOSIS — M54.2 NECK PAIN: Primary | ICD-10-CM

## 2023-04-03 PROCEDURE — 97162 PT EVAL MOD COMPLEX 30 MIN: CPT

## 2023-04-03 PROCEDURE — 97110 THERAPEUTIC EXERCISES: CPT

## 2023-04-03 PROCEDURE — 97140 MANUAL THERAPY 1/> REGIONS: CPT

## 2023-04-03 NOTE — PROGRESS NOTES
[address]  [address] [address]  Dept: [phone]      Physical Therapy Initial Assessment     Referring MD: Argenis Reis PA-C  Diagnosis:     ICD-10-CM    1. Neck pain  M54.2       2. Impaired range of motion of cervical spine  M53.82       3. Cervical radiculopathy  M54.12       4. Impaired mobility and ADLs  Z74.09     Z78.9       5. Impaired motor control  Z78.9          Surgery: n/a     Therapy precautions:None  Co-morbidities affecting plan of care: n/a  Total Timed Procedure Codes: 25 min, Total Time: 45 min    PERTINENT MEDICAL HISTORY     Past medical and surgical history:   Past Medical History:   Diagnosis Date    Anemia     Fatty liver     Major depression     Mitral valve prolapse     Nodule of left lobe of thyroid gland       Past Surgical History:   Procedure Laterality Date    HYSTERECTOMY (CERVIX STATUS UNKNOWN)      patient does not know if ovaries were removed    OVARIAN CYST REMOVAL  1980's    TUBAL LIGATION  12/1984     Medications: reviewed in chart   Allergies: No Known Allergies     Diagnostic exams (per chart review):   AP and Lateral views of the Cervical Spine: 3/10/23  AP of the cervical spine shows normal alignment there is facet arthropathy. Sagittal view of the cervical spine shows straightening of the normal lordotic curve. Multilevel degenerative changes. Degenerative disc at C6-7 with large anterior osteophyte. X-ray impression: Cervical degenerative change    SUBJECTIVE     Chief complaints/history of injury: Patient reports longstanding history of R side neck pain, with treatments including medication and injections years ago. She had onset of increased pain in February with radiation and numbness into the RUE, causing her to present to the ED for evaluation.     Date symptoms began: 2009  Nature of condition: Chronic (continuous duration > 3 months)  Primary cause of current episode: Unspecified  How did symptoms start:

## 2023-04-04 NOTE — PROGRESS NOTES
Left Right   Flexion No min   Abduction No min   Functional IR T4 T4   Functional ER       Stability  Cervical    Sharp's Beth -   Alar Ligament -   Transverse Ligament -     Treatment provided today Manual therapy (57020) x 15 min utilizing techniques to improve joint and/or soft tissue mobility, ROM, and function as well as helping to decrease pain/spasms and swelling. Palpation and assessment of soft tissue, muscles, and landmarks   STM to upper trap and cervical soft tissue  Cervical mobilization  Rotational HVLAT (B, seated) grade 5  Traction, grade 1-3  Thoracic mobilization  Occipital Release    Therapeutic exercise (75415) x 30 min to develop ROM, strength, endurance and flexibility of the UQ. UBE x 2 min forward 2 Minutes back  Cervical Side bend Stretch 2x 30s Bilateral c manual OP  Supine chin tucks with scap retract 2x12  Cervical Extension AROM with Strap  5s hold 2x12  Seated Thoracic Lumbar Extension 2x10 with 5s  Seated Scapular Retractions-  3x10  Mid Row with TRX 2x10    Patient Education on the condition/pathology, involved anatomy, and exercise rationale. Patient also instructed on the performance of a HEP as noted below. Educated patient on keeping shoulders relaxed to reduce tension on neck    PLAN     Cont with current POC. Continue to add scapular strengthening and stability exercises. GOALS     Short term goals to be met by 5/1/2023  (4 weeks):  Pt will show good recall of HEP requiring no more than minimal verbal cuing for proper form and technique. Pt will increase AROM to pain free, mod restriction, in order to resume driving safely. Pt will improve DNF endurance to 15 seconds, demonstrating improved cervical strength. Pt will discontinue use of pain medication to address neck pain. Pt will report symptoms have centralized to no farther than R side CTJ, indicating nerve healing and improving pain levels and muscle activation.     Long term goals to be met by 5/29/2023  (8

## 2023-04-05 ENCOUNTER — OFFICE VISIT (OUTPATIENT)
Age: 58
End: 2023-04-05

## 2023-04-05 DIAGNOSIS — M53.82 IMPAIRED RANGE OF MOTION OF CERVICAL SPINE: Primary | ICD-10-CM

## 2023-04-05 DIAGNOSIS — M54.2 NECK PAIN: ICD-10-CM

## 2023-04-05 DIAGNOSIS — Z78.9 IMPAIRED MOBILITY AND ADLS: ICD-10-CM

## 2023-04-05 DIAGNOSIS — M54.12 CERVICAL RADICULOPATHY: ICD-10-CM

## 2023-04-05 DIAGNOSIS — Z78.9 IMPAIRED MOTOR CONTROL: ICD-10-CM

## 2023-04-05 DIAGNOSIS — Z74.09 IMPAIRED MOBILITY AND ADLS: ICD-10-CM

## 2023-04-21 ENCOUNTER — OFFICE VISIT (OUTPATIENT)
Dept: ORTHOPEDIC SURGERY | Age: 58
End: 2023-04-21
Payer: MEDICARE

## 2023-04-21 DIAGNOSIS — M47.812 CERVICAL FACET JOINT SYNDROME: Primary | ICD-10-CM

## 2023-04-21 DIAGNOSIS — M54.12 CERVICAL RADICULOPATHY: ICD-10-CM

## 2023-04-21 DIAGNOSIS — M50.30 DDD (DEGENERATIVE DISC DISEASE), CERVICAL: ICD-10-CM

## 2023-04-21 PROCEDURE — 99213 OFFICE O/P EST LOW 20 MIN: CPT | Performed by: PHYSICIAN ASSISTANT

## 2023-04-21 NOTE — PROGRESS NOTES
An MRI of the Cervical spine has been ordered.
a discectomy and fusion if the symptoms fail to improve or there is a progressive neurologic deficit and conservative management has been exhausted. She feels that injections helped in the past and she would be interested in doing these again. I would recommend further imaging. -MRI: A cervical MRI was ordered to confirm the diagnosis and assess the severity. No orders of the defined types were placed in this encounter. Orders Placed This Encounter   Procedures    MRI CERVICAL SPINE WO CONTRAST        4 This is a undiagnosed new problem with uncertain prognosis      No follow-ups on file. Zhane Smith PA-C  04/21/23      Elements of this note were created using speech recognition software. As such, errors of speech recognition may be present.

## 2023-05-01 ENCOUNTER — OFFICE VISIT (OUTPATIENT)
Dept: ORTHOPEDIC SURGERY | Age: 58
End: 2023-05-01
Payer: MEDICARE

## 2023-05-01 DIAGNOSIS — M54.12 CERVICAL RADICULOPATHY: ICD-10-CM

## 2023-05-01 DIAGNOSIS — M50.30 DDD (DEGENERATIVE DISC DISEASE), CERVICAL: ICD-10-CM

## 2023-05-01 DIAGNOSIS — M48.02 FORAMINAL STENOSIS OF CERVICAL REGION: Primary | ICD-10-CM

## 2023-05-01 PROCEDURE — 99214 OFFICE O/P EST MOD 30 MIN: CPT | Performed by: PHYSICIAN ASSISTANT

## 2023-05-01 NOTE — PROGRESS NOTES
Name: Debbie Winchester  YOB: 1965  Gender: female  MRN: [id number]    CC: Neck Pain (MRI results)         History of present illness: This is a very pleasant 62 y.o. old female who  has a past medical history of Anemia, Fatty liver, Major depression, Mitral valve prolapse, and Nodule of left lobe of thyroid gland. .  Presents with 3-month history of pain in her neck mostly on the right of the neck. It began in December actually felt like she had pain radiating towards her ear and her scalp and her ear stopped up that has resolved but she now has pain in the right shoulder right posterior arm under the arm there is some occasional tingling in the arm. She feels like there is a pulling she certainly noticed decreased range of motion of the neck. She has not had difficulty with fine motor activity. She is a  she has difficulty with turning especially after driving long distances. She has been on tramadol, duloxetine and had massage therapy. She recalls she was previously seeing Dr. Barb Foley with pain management and had some injections in her neck and her back in the past that were helpful. She has not seen her in quite some time. We referred her to physical therapy prescribed oral steroids and she have some improvement with physical therapy she still having tightness in her neck and some shooting pain into the scalp. The radicular pain in the arm improved but it still goes into the shoulder. Continued radicular pain, we ordered MRI scan of the cervical spine. AMB PAIN ASSESSMENT 3/10/2023   Location of Pain Neck   Severity of Pain 5   Quality of Pain Aching; Other (Comment)   Duration of Pain A few hours   Frequency of Pain Intermittent   Date Pain First Started 6/20/2008   Aggravating Factors Other (Comment)   Limiting Behavior Some   Relieving Factors Other (Comment)   Result of Injury No   Work-Related Injury No   Are there other pain locations you wish to document?

## 2023-11-04 ENCOUNTER — CLINICAL DOCUMENTATION (OUTPATIENT)
Age: 58
End: 2023-11-04

## 2023-11-04 NOTE — PROGRESS NOTES
[facility]  [address] [place] [address],[place] [address]  Dept: [phone]      Physical Therapy Discharge/Discontinuation Note       Patient has been discharged from therapy based on Patient noncompliant. Patient was  last seen for PT services on 4/14/23. At that time, the patient appeared to be gradually progressing with therapy treatment. Therapy goals were not met. Please see previous notes for objective findings. [x] All POC signed:  Go to Notes / Go to Scanned Items  [x] Send message to Auth team to close referral: Go to InBox   [x] Episode Resolved:  Go to Episode  [x] All remaining visits canceled:  Go to Appt Desk  No future appointments.

## 2024-09-30 NOTE — PROGRESS NOTES
or equal to 1 cm, follow with ultrasound annually   for 5 years if greater than or equal to 0.5 cm.         Follow-up and Dispositions    Return if symptoms worsen or fail to improve.         History of Present Illness:    THYROID NODULES  Annie Cherry is seen today in the [facility] for new evaluation and treatment of a thyroid nodule, first noted on exam in 2019.       Symptoms: See review of systems below     Risk factors for malignancy: There is not a history of radiation to the head/neck other than medical/dental imaging.  The patient does not have a family history of thyroid cancer.       Prior imagin2019: Ultrasound ([facility])- Right lobe 5.2 x 1.7 x 1.6 cm, isthmus 0.4 cm, left lobe 4.8 x 2.3 x 2.3 cm.  Homogeneous echotexture.  2.7 x 2.0 x 2.0 cm echogenic nodule at the left lower pole.  Multiple bilateral benign-appearing lymph nodes.     10/1/2020: Ultrasound ([facility])- Right lobe 1.59 x 1.46 x 5.20 cm, isthmus 0.22 cm, left lobe 1.74 x 2.19 x 4.40 cm.  Homogeneous echotexture.  Normal blood flow.  1.27 x 1.88 x 2.69 cm isoechoic nodule without calcifications or increased blood flow at the left lower pole (TR3).  0.73 x 1.09 x 1.21 cm rounded hypoechoic mass superior to the right thyroid lobe (faint hilum is noted; likely lymph node).  0.73 x 0.97 hypoechoic mass in the left level IIA (faint hilum is noted; likely lymph node).    12/15/2023: Ultrasound ([facility])- Right lobe 5.3 x 1.6 x 1.7 cm, isthmus 0.2 cm, left lobe 5.1 x 2.3 x 1.9 cm.  Homogeneous echotexture.  2.7 x 1.8 x 1.9 cm hyperechoic or isoechoic nodule without echogenic foci at the left lower pole (TR 3).  No cervical lymphadenopathy.    10/1/2024: Ultrasound ([facility])- Right lobe 1.61 x 2.02 x 5.18 cm, isthmus 0.24 cm, left lobe 1.97 x 2.28 x 4.48 cm.  Homogeneous echotexture.  Normal blood flow.  1.63 x 1.98 x 2.64 cm complex isoechoic nodule without calcifications or increased blood flow at the left lower

## 2024-10-01 ENCOUNTER — OFFICE VISIT (OUTPATIENT)
Dept: ENDOCRINOLOGY | Age: 59
End: 2024-10-01
Payer: MEDICARE

## 2024-10-01 VITALS — BODY MASS INDEX: 25.65 KG/M2 | SYSTOLIC BLOOD PRESSURE: 125 MMHG | WEIGHT: 127 LBS | DIASTOLIC BLOOD PRESSURE: 80 MMHG

## 2024-10-01 DIAGNOSIS — E04.1 NODULE OF LEFT LOBE OF THYROID GLAND: Primary | ICD-10-CM

## 2024-10-01 DIAGNOSIS — E04.1 NODULE OF LEFT LOBE OF THYROID GLAND: ICD-10-CM

## 2024-10-01 PROCEDURE — 76536 US EXAM OF HEAD AND NECK: CPT | Performed by: INTERNAL MEDICINE

## 2024-10-01 PROCEDURE — 99203 OFFICE O/P NEW LOW 30 MIN: CPT | Performed by: INTERNAL MEDICINE

## 2024-10-01 ASSESSMENT — ENCOUNTER SYMPTOMS
TROUBLE SWALLOWING: 1
VOICE CHANGE: 1

## 2024-10-02 LAB — TSH W FREE THYROID IF ABNORMAL: 0.56 UIU/ML (ref 0.27–4.2)

## 2024-12-02 ENCOUNTER — OFFICE VISIT (OUTPATIENT)
Dept: ORTHOPEDIC SURGERY | Age: 59
End: 2024-12-02
Payer: MEDICARE

## 2024-12-02 DIAGNOSIS — M54.50 LOW BACK PAIN AT MULTIPLE SITES: ICD-10-CM

## 2024-12-02 DIAGNOSIS — M25.561 RIGHT KNEE PAIN, UNSPECIFIED CHRONICITY: ICD-10-CM

## 2024-12-02 DIAGNOSIS — M17.11 PRIMARY OSTEOARTHRITIS OF RIGHT KNEE: Primary | ICD-10-CM

## 2024-12-02 PROCEDURE — 20610 DRAIN/INJ JOINT/BURSA W/O US: CPT | Performed by: PHYSICIAN ASSISTANT

## 2024-12-02 PROCEDURE — 99203 OFFICE O/P NEW LOW 30 MIN: CPT | Performed by: PHYSICIAN ASSISTANT

## 2024-12-02 RX ORDER — METHYLPREDNISOLONE ACETATE 40 MG/ML
40 INJECTION, SUSPENSION INTRA-ARTICULAR; INTRALESIONAL; INTRAMUSCULAR; SOFT TISSUE ONCE
Status: COMPLETED | OUTPATIENT
Start: 2024-12-02 | End: 2024-12-02

## 2024-12-02 RX ADMIN — METHYLPREDNISOLONE ACETATE 40 MG: 40 INJECTION, SUSPENSION INTRA-ARTICULAR; INTRALESIONAL; INTRAMUSCULAR; SOFT TISSUE at 15:39

## 2024-12-02 NOTE — PROGRESS NOTES
Name: Annie Cherry  YOB: 1965  Gender: female  MRN: [id number]      Chief complaint:  RIGHT knee pain    History of Present Illness:     Annie Cherry is a 59 y.o. female  The pain has been present for 3 months after hitting her knee on an object at that time..  They state the pain is located anterior and medial.  The patient describes the quality of the pain as a deep ache.    The symptoms are exacerbated by weight bearing, walking and standing for long periods of time.    The pain is also exacerbated by ascending and descending stairs, getting up and down from a chair.    They deny any previous surgery on the knee.  Previous treatment includes anti-inflammatories, use of cane/walker and activity modification.  These treatment options have not helped.  This is a history of chronic low back pain has had previous epidural steroid injections performed medically medically.     Past Medical History:    Allergies:  No Known Allergies    Medications:  Current Outpatient Medications   Medication Sig    buPROPion (WELLBUTRIN XL) 150 MG extended release tablet     DULoxetine (CYMBALTA) 60 MG extended release capsule     DULoxetine (CYMBALTA) 30 MG extended release capsule TAKE ONE CAPSULE BY MOUTH ONE TIME DAILY FOR 7 DAYS, THEN TAKE TWO CAPSULES BY MOUTH ONE TIME DAILY FOR 30 DAYS    ferrous sulfate (IRON 325) 325 (65 Fe) MG tablet  (Patient not taking: Reported on 10/1/2024)    methylPREDNISolone (MEDROL DOSEPACK) 4 MG tablet Take by mouth as directed. (Patient not taking: Reported on 10/1/2024)    meloxicam (MOBIC) 7.5 MG tablet Take 1 tablet by mouth daily (Patient not taking: Reported on 10/1/2024)    hydroCHLOROthiazide (HYDRODIURIL) 12.5 MG tablet Take 12.5 mg by mouth daily (Patient not taking: Reported on 10/1/2024)     No current facility-administered medications for this visit.       Past Medical history:  Past Medical History:   Diagnosis Date    Anemia     Fatty liver     Major depression

## 2024-12-10 ENCOUNTER — TELEPHONE (OUTPATIENT)
Dept: ORTHOPEDIC SURGERY | Age: 59
End: 2024-12-10

## 2024-12-10 NOTE — TELEPHONE ENCOUNTER
Whomever was speaking  with t his pt  about her injections  please  call her  back  again    Thank you   she  said it  was an MA

## 2024-12-12 ENCOUNTER — TELEPHONE (OUTPATIENT)
Dept: ORTHOPEDIC SURGERY | Age: 59
End: 2024-12-12